# Patient Record
Sex: MALE | Race: BLACK OR AFRICAN AMERICAN | NOT HISPANIC OR LATINO
[De-identification: names, ages, dates, MRNs, and addresses within clinical notes are randomized per-mention and may not be internally consistent; named-entity substitution may affect disease eponyms.]

---

## 2020-12-15 ENCOUNTER — APPOINTMENT (OUTPATIENT)
Dept: UROLOGY | Facility: CLINIC | Age: 55
End: 2020-12-15
Payer: MEDICAID

## 2020-12-15 VITALS
BODY MASS INDEX: 27.83 KG/M2 | TEMPERATURE: 97.4 F | SYSTOLIC BLOOD PRESSURE: 123 MMHG | HEIGHT: 73 IN | DIASTOLIC BLOOD PRESSURE: 74 MMHG | HEART RATE: 59 BPM | WEIGHT: 210 LBS

## 2020-12-15 DIAGNOSIS — Z01.818 ENCOUNTER FOR OTHER PREPROCEDURAL EXAMINATION: ICD-10-CM

## 2020-12-15 PROBLEM — Z00.00 ENCOUNTER FOR PREVENTIVE HEALTH EXAMINATION: Status: ACTIVE | Noted: 2020-12-15

## 2020-12-15 PROCEDURE — 99072 ADDL SUPL MATRL&STAF TM PHE: CPT

## 2020-12-15 PROCEDURE — 99205 OFFICE O/P NEW HI 60 MIN: CPT | Mod: 25

## 2020-12-15 PROCEDURE — 76870 US EXAM SCROTUM: CPT

## 2020-12-15 PROCEDURE — 93976 VASCULAR STUDY: CPT

## 2020-12-15 NOTE — HISTORY OF PRESENT ILLNESS
[FreeTextEntry1] : Right side hydrocele \par - noticed in June\par was in [facility] to have it drained without success \par No records are available to me at this point.\par \par no medical issues \par no medications \par \par no DM \par never had CAD \par \par erections are fair \par symptomatic - difficulties walking and working \par

## 2020-12-15 NOTE — PHYSICAL EXAM
[General Appearance - Well Developed] : well developed [General Appearance - Well Nourished] : well nourished [Normal Appearance] : normal appearance [Well Groomed] : well groomed [General Appearance - In No Acute Distress] : no acute distress [Edema] : no peripheral edema [Respiration, Rhythm And Depth] : normal respiratory rhythm and effort [Exaggerated Use Of Accessory Muscles For Inspiration] : no accessory muscle use [Abdomen Soft] : soft [Abdomen Tenderness] : non-tender [Costovertebral Angle Tenderness] : no ~M costovertebral angle tenderness [Urethral Meatus] : meatus normal [Urinary Bladder Findings] : the bladder was normal on palpation [Scrotum] : the scrotum was normal [Testes Mass (___cm)] : there were no testicular masses [Normal Station and Gait] : the gait and station were normal for the patient's age [] : no rash [No Focal Deficits] : no focal deficits [Oriented To Time, Place, And Person] : oriented to person, place, and time [Affect] : the affect was normal [Mood] : the mood was normal [Not Anxious] : not anxious [No Palpable Adenopathy] : no palpable adenopathy [FreeTextEntry1] : Right hydrocele 40 cm x 35 cm left testis palpable , scar from hernia repair the right side

## 2020-12-15 NOTE — ASSESSMENT
[FreeTextEntry1] : Extremely large hydrocele \par complex\par This is a 55-year-old male who presented with extremely large right scrotal mass.  The mass seems to be there for over a year.  Since June it has been bothering him more.  He has problems walking and working because of the size of the mass.\par \par He denies any weight loss, abdominal pain, he has no constipation, he is no nausea or vomiting no problems with GI track indicating possible obstruction or involvement of the GI tract in this large mass.  He underwent drainage of the mass in the [facility] without success.  We have not seen any records from the previous hospital.\par \par On the physical examination which was performed with nurse practitioner and also Dr. Gr he is a very large mass almost to the knees on the right side.  His left testicle is palpable and atrophic.  The penis is covered by the skin of that large scrotal mass.  There is no fixation of the scrotal skin to the penis or pelvic floor.  The mass is nontender.  Patient has no pain.  His discomfort is from mechanical issue due to his the size of the mass.\par \par The scrotal ultrasound performed showed distinct ending of the fluid collection in the lower right abdomen under the skin.  The mass does not seem to going to the inguinal canal and does not seem to include intestine or peritoneal cavity.  Within the mass there are occasional separate and papillary-like structures.  This is consistent with complex cystic mass.  Within the left testicle there is a second cystic structure with papillary-like appearance within the testis.\par \par He denies any travel abroad to the areas where you feel [place] is could be considered.\par \par I had long discussion with this patient that this is large and complex issue.  We need to get better imaging.  I contacted radiologist here at [facility] regarding performing CT scan of the abdomen pelvis as well as chest CT to exclude any metastasis.\par \par I do not think this is malignant disease because he does not seem to clinically invade any of the structures.\par \par He is otherwise healthy and does not take any medications.\par \par I referred him to plastic surgeon Dr. Spaulding for consultation as he may need skin reduction and flap management to cover any dead space to prevent lymphocele.\par \par We will admit him for 23-hour observation or inpatient.  Type and screen will be performed.  The possibility of orchiectomy on the right side have also been discussed.\par \par I have also contacted  pathologist to be sure that we are able to perform frozen section and assess pathology to exclude malignancy during the surgery.  We will plan to surgery in January together with the plastics.\par \par Total time over 60 minutes, more than 50% of time spent counseling and coordinating care.

## 2020-12-17 PROBLEM — Z01.818 PREOPERATIVE EVALUATION TO RULE OUT SURGICAL CONTRAINDICATION: Status: ACTIVE | Noted: 2020-12-17

## 2020-12-17 LAB
ALBUMIN SERPL ELPH-MCNC: 4.2 G/DL
ALP BLD-CCNC: 74 U/L
ALT SERPL-CCNC: 8 U/L
ANION GAP SERPL CALC-SCNC: 11 MMOL/L
AST SERPL-CCNC: 13 U/L
BASOPHILS # BLD AUTO: 0.03 K/UL
BASOPHILS NFR BLD AUTO: 0.4 %
BILIRUB SERPL-MCNC: 0.3 MG/DL
BUN SERPL-MCNC: 16 MG/DL
CALCIUM SERPL-MCNC: 9.7 MG/DL
CHLORIDE SERPL-SCNC: 105 MMOL/L
CO2 SERPL-SCNC: 26 MMOL/L
CREAT SERPL-MCNC: 1.23 MG/DL
EOSINOPHIL # BLD AUTO: 0.03 K/UL
EOSINOPHIL NFR BLD AUTO: 0.4 %
GLUCOSE SERPL-MCNC: 100 MG/DL
HBV CORE IGG+IGM SER QL: NONREACTIVE
HCT VFR BLD CALC: 33.4 %
HCV AB SER QL: NONREACTIVE
HCV S/CO RATIO: 0.24 S/CO
HGB BLD-MCNC: 9.5 G/DL
IMM GRANULOCYTES NFR BLD AUTO: 0.7 %
LYMPHOCYTES # BLD AUTO: 2.52 K/UL
LYMPHOCYTES NFR BLD AUTO: 34.3 %
MAN DIFF?: NORMAL
MCHC RBC-ENTMCNC: 21.8 PG
MCHC RBC-ENTMCNC: 28.4 GM/DL
MCV RBC AUTO: 76.8 FL
MONOCYTES # BLD AUTO: 0.48 K/UL
MONOCYTES NFR BLD AUTO: 6.5 %
NEUTROPHILS # BLD AUTO: 4.23 K/UL
NEUTROPHILS NFR BLD AUTO: 57.7 %
PLATELET # BLD AUTO: 295 K/UL
POTASSIUM SERPL-SCNC: 4.9 MMOL/L
PROT SERPL-MCNC: 9.3 G/DL
PSA FREE FLD-MCNC: 42 %
PSA FREE SERPL-MCNC: 0.11 NG/ML
PSA SERPL-MCNC: 0.25 NG/ML
RBC # BLD: 4.35 M/UL
RBC # FLD: 15.6 %
SODIUM SERPL-SCNC: 142 MMOL/L
WBC # FLD AUTO: 7.34 K/UL

## 2020-12-17 RX ORDER — LATANOPROST/PF 0.005 %
0.01 DROPS OPHTHALMIC (EYE)
Qty: 2 | Refills: 0 | Status: ACTIVE | COMMUNITY
Start: 2020-11-17

## 2020-12-17 RX ORDER — BRIMONIDINE TARTRATE, TIMOLOL MALEATE 2; 5 MG/ML; MG/ML
0.2-0.5 SOLUTION/ DROPS OPHTHALMIC
Qty: 5 | Refills: 0 | Status: ACTIVE | COMMUNITY
Start: 2020-11-17

## 2020-12-22 LAB
TESTOST BND SERPL-MCNC: 5.2 PG/ML
TESTOST SERPL-MCNC: 414.7 NG/DL

## 2020-12-29 ENCOUNTER — APPOINTMENT (OUTPATIENT)
Dept: CT IMAGING | Facility: CLINIC | Age: 55
End: 2020-12-29
Payer: MEDICAID

## 2020-12-29 ENCOUNTER — OUTPATIENT (OUTPATIENT)
Dept: OUTPATIENT SERVICES | Facility: HOSPITAL | Age: 55
LOS: 1 days | End: 2020-12-29
Payer: MEDICAID

## 2020-12-29 DIAGNOSIS — R19.00 INTRA-ABDOMINAL AND PELVIC SWELLING, MASS AND LUMP, UNSPECIFIED SITE: ICD-10-CM

## 2020-12-29 PROCEDURE — 71260 CT THORAX DX C+: CPT

## 2020-12-29 PROCEDURE — 74177 CT ABD & PELVIS W/CONTRAST: CPT

## 2020-12-29 PROCEDURE — 74177 CT ABD & PELVIS W/CONTRAST: CPT | Mod: 26

## 2020-12-29 PROCEDURE — 71260 CT THORAX DX C+: CPT | Mod: 26

## 2021-01-04 ENCOUNTER — NON-APPOINTMENT (OUTPATIENT)
Age: 56
End: 2021-01-04

## 2021-01-04 DIAGNOSIS — D64.9 ANEMIA, UNSPECIFIED: ICD-10-CM

## 2021-01-12 ENCOUNTER — APPOINTMENT (OUTPATIENT)
Dept: COLORECTAL SURGERY | Facility: CLINIC | Age: 56
End: 2021-01-12
Payer: MEDICAID

## 2021-01-12 VITALS
HEART RATE: 66 BPM | DIASTOLIC BLOOD PRESSURE: 70 MMHG | SYSTOLIC BLOOD PRESSURE: 106 MMHG | TEMPERATURE: 98 F | RESPIRATION RATE: 16 BRPM | HEIGHT: 73 IN | WEIGHT: 215 LBS | BODY MASS INDEX: 28.49 KG/M2 | OXYGEN SATURATION: 99 %

## 2021-01-12 DIAGNOSIS — Z80.9 FAMILY HISTORY OF MALIGNANT NEOPLASM, UNSPECIFIED: ICD-10-CM

## 2021-01-12 DIAGNOSIS — H40.9 UNSPECIFIED GLAUCOMA: ICD-10-CM

## 2021-01-12 DIAGNOSIS — N50.0 ATROPHY OF TESTIS: ICD-10-CM

## 2021-01-12 PROCEDURE — 99072 ADDL SUPL MATRL&STAF TM PHE: CPT

## 2021-01-12 PROCEDURE — 99204 OFFICE O/P NEW MOD 45 MIN: CPT

## 2021-01-12 NOTE — HISTORY OF PRESENT ILLNESS
[FreeTextEntry1] : 55-year-old male developed an inguinal hernia over a year ago.He underwent repair left inguinal hernia at an outside facility in which she reports drainage catheters were placed on the right side. He was told he had a hydrocele, but the drain would not completely resolve his issues. Since that time he reports increased size of the right scrotum. He was evaluated by urology and a CT scan was performed showing a large scrotal cystic mass. There is a small amount of air and a portion of this which is likely secondary to prior instrumentation. He reports pain at the site no fevers or chills no change in bowel habits no nausea or vomiting. No dominant pain. Prior history of laparoscopic cholecystectomy. Patient does not have records from outside facility for review

## 2021-01-12 NOTE — ASSESSMENT
[FreeTextEntry1] : Large right scrotal mass of unclear etiology. Likely consistent with a large hydrocele however cyst/malignancy cannot be ruled out\par -Case has been discussed with urology\par -Will obtain MRI to better evaluate lesion, however additional imaging may not fully elucidate diagnoses\par -Patient will be scheduled for right inguinal/scrotal mass excision. Procedure will be performed the right inguinal incision with possible diagnostic laparoscopy possible laparotomy. Dr. Ramos is also discussed the possibility of orchiectomy at the time of the procedure. Patient will require inguinal hernia repair and possibly mesh implantation\par -Risks and benefits were reviewed\par -Patient will undergo repeat imaging\par -Will initiate scheduling of resection.\par -Plastic surgery evaluation

## 2021-01-12 NOTE — CONSULT LETTER
[Dear  ___] : Dear  [unfilled], [Consult Letter:] : I had the pleasure of evaluating your patient, [unfilled]. [Please see my note below.] : Please see my note below. [Consult Closing:] : Thank you very much for allowing me to participate in the care of this patient.  If you have any questions, please do not hesitate to contact me. [Sincerely,] : Sincerely, [FreeTextEntry2] : Ricky Ramos [FreeTextEntry3] : Ryan Martini MD FACS\par Chief Colon and Rectal Surgery\par [facility]

## 2021-01-12 NOTE — PHYSICAL EXAM
[Normal Breath Sounds] : Normal breath sounds [Normal Heart Sounds] : normal heart sounds [Normal Rate and Rhythm] : normal rate and rhythm [No Rash or Lesion] : No rash or lesion [Alert] : alert [Oriented to Person] : oriented to person [Oriented to Place] : oriented to place [Oriented to Time] : oriented to time [Calm] : calm [de-identified] : round, NT/ND, +BS [de-identified] : Normal Male [de-identified] : NC/AT [de-identified] : MARIANELA/+ROM [de-identified] : Intact

## 2021-01-13 ENCOUNTER — APPOINTMENT (OUTPATIENT)
Dept: PLASTIC SURGERY | Facility: CLINIC | Age: 56
End: 2021-01-13
Payer: MEDICAID

## 2021-01-13 VITALS
HEIGHT: 73 IN | HEART RATE: 52 BPM | BODY MASS INDEX: 28.89 KG/M2 | DIASTOLIC BLOOD PRESSURE: 66 MMHG | TEMPERATURE: 98.2 F | SYSTOLIC BLOOD PRESSURE: 110 MMHG | WEIGHT: 218 LBS

## 2021-01-13 DIAGNOSIS — Z60.2 PROBLEMS RELATED TO LIVING ALONE: ICD-10-CM

## 2021-01-13 DIAGNOSIS — Z72.3 LACK OF PHYSICAL EXERCISE: ICD-10-CM

## 2021-01-13 DIAGNOSIS — R19.00 INTRA-ABDOMINAL AND PELVIC SWELLING, MASS AND LUMP, UNSPECIFIED SITE: ICD-10-CM

## 2021-01-13 PROCEDURE — 99203 OFFICE O/P NEW LOW 30 MIN: CPT

## 2021-01-13 PROCEDURE — 99072 ADDL SUPL MATRL&STAF TM PHE: CPT

## 2021-01-13 SDOH — SOCIAL STABILITY - SOCIAL INSECURITY: PROBLEMS RELATED TO LIVING ALONE: Z60.2

## 2021-01-13 NOTE — REVIEW OF SYSTEMS
[Eyesight Problems] : eyesight problems [Dry Eyes] : dryness of the eyes [Proptosis] : proptosis [Negative] : Heme/Lymph

## 2021-01-16 PROBLEM — Z72.3 DOES NOT EXERCISE: Status: ACTIVE | Noted: 2021-01-13

## 2021-01-16 PROBLEM — R19.00 PELVIC MASS IN MALE: Status: ACTIVE | Noted: 2020-12-15

## 2021-01-16 PROBLEM — Z60.2 LIVES ALONE: Status: ACTIVE | Noted: 2021-01-13

## 2021-01-16 NOTE — REASON FOR VISIT
[Consultation] : a consultation visit [FreeTextEntry1] : Patient present to the office today at the request of Dr. Ricky Ramos for hydrocele removal from a previous hernia operation

## 2021-01-16 NOTE — PHYSICAL EXAM
[de-identified] : NAD. BMI 28.8 [de-identified] : right hemiscrotum with large mass c/w hydrocele, tense, with inability to palpate inguinal ring. Well-healed left inguinal incision. No visible lesions over penis, scrotum, or perineum. left testis without mass or significant palpable abnormality [de-identified] : Head: NC/AT\par Eyes: sclerae clear, EOMI\par ENT: hearing grossly normal, no gross nasal deformity\par Resp: normal respiratory effort, no accessory muscle use\par MSK: normal gait and posture\par Psych: normal affect, appropriate\par

## 2021-01-16 NOTE — HISTORY OF PRESENT ILLNESS
[FreeTextEntry1] : 54 y/o man with large right hydrocele presents for discussion of reconstructive options following planned hydrocele removal and hernia repair. The patient has already had a left inguinal hernia repair at an outside hospital which required drainage of the right scrotal mass. The patient has been seen by colorectal surgery and there is a plan for MRI to better characterize the lesion. The patient is here to discuss reconstructive options.\par \par He denies any wounds on the scrotum. He denies any F/C/S. He denies any sensory abnormalities of the scrotum or penis.\par \par He lives alone. He does not work. He denies nicotine, drinking, and recreational drug use.

## 2021-01-16 NOTE — ASSESSMENT
[FreeTextEntry1] : Large right scrotal mass. Will plan to assist with reconstruction. It is possible that, if the scrotal skin is healthy-appearing and not too significantly expanded, that no scrotal skin excision may be required. However, if limited recoil or skin not healthy-appearing following mass excision, or scrotal access required, will plan for skin excision and complex repair.

## 2021-01-26 ENCOUNTER — OUTPATIENT (OUTPATIENT)
Dept: OUTPATIENT SERVICES | Facility: HOSPITAL | Age: 56
LOS: 1 days | End: 2021-01-26
Payer: MEDICAID

## 2021-01-26 ENCOUNTER — RESULT REVIEW (OUTPATIENT)
Age: 56
End: 2021-01-26

## 2021-01-26 ENCOUNTER — APPOINTMENT (OUTPATIENT)
Dept: MRI IMAGING | Facility: CLINIC | Age: 56
End: 2021-01-26
Payer: MEDICAID

## 2021-01-26 DIAGNOSIS — R19.00 INTRA-ABDOMINAL AND PELVIC SWELLING, MASS AND LUMP, UNSPECIFIED SITE: ICD-10-CM

## 2021-01-26 DIAGNOSIS — Z00.8 ENCOUNTER FOR OTHER GENERAL EXAMINATION: ICD-10-CM

## 2021-01-26 PROCEDURE — 72197 MRI PELVIS W/O & W/DYE: CPT

## 2021-01-26 PROCEDURE — 72197 MRI PELVIS W/O & W/DYE: CPT | Mod: 26

## 2021-02-05 ENCOUNTER — OUTPATIENT (OUTPATIENT)
Dept: OUTPATIENT SERVICES | Facility: HOSPITAL | Age: 56
LOS: 1 days | End: 2021-02-05
Payer: COMMERCIAL

## 2021-02-05 VITALS
DIASTOLIC BLOOD PRESSURE: 84 MMHG | HEART RATE: 61 BPM | SYSTOLIC BLOOD PRESSURE: 134 MMHG | OXYGEN SATURATION: 98 % | WEIGHT: 220.02 LBS | HEIGHT: 73 IN | RESPIRATION RATE: 16 BRPM | TEMPERATURE: 98 F

## 2021-02-05 DIAGNOSIS — Z98.890 OTHER SPECIFIED POSTPROCEDURAL STATES: Chronic | ICD-10-CM

## 2021-02-05 DIAGNOSIS — R19.00 INTRA-ABDOMINAL AND PELVIC SWELLING, MASS AND LUMP, UNSPECIFIED SITE: ICD-10-CM

## 2021-02-05 DIAGNOSIS — H40.9 UNSPECIFIED GLAUCOMA: ICD-10-CM

## 2021-02-05 DIAGNOSIS — K80.20 CALCULUS OF GALLBLADDER WITHOUT CHOLECYSTITIS WITHOUT OBSTRUCTION: Chronic | ICD-10-CM

## 2021-02-05 DIAGNOSIS — Z01.812 ENCOUNTER FOR PREPROCEDURAL LABORATORY EXAMINATION: ICD-10-CM

## 2021-02-05 LAB
BLD GP AB SCN SERPL QL: POSITIVE — SIGNIFICANT CHANGE UP
HCT VFR BLD CALC: 35 % — LOW (ref 39–50)
HGB BLD-MCNC: 9.5 G/DL — LOW (ref 13–17)
MCHC RBC-ENTMCNC: 21.6 PG — LOW (ref 27–34)
MCHC RBC-ENTMCNC: 27.1 GM/DL — LOW (ref 32–36)
MCV RBC AUTO: 79.5 FL — LOW (ref 80–100)
NRBC # BLD: 0 /100 WBCS — SIGNIFICANT CHANGE UP
NRBC # FLD: 0 K/UL — SIGNIFICANT CHANGE UP
PLATELET # BLD AUTO: 227 K/UL — SIGNIFICANT CHANGE UP (ref 150–400)
RBC # BLD: 4.4 M/UL — SIGNIFICANT CHANGE UP (ref 4.2–5.8)
RBC # FLD: 15.5 % — HIGH (ref 10.3–14.5)
RH IG SCN BLD-IMP: NEGATIVE — SIGNIFICANT CHANGE UP
WBC # BLD: 6.52 K/UL — SIGNIFICANT CHANGE UP (ref 3.8–10.5)
WBC # FLD AUTO: 6.52 K/UL — SIGNIFICANT CHANGE UP (ref 3.8–10.5)

## 2021-02-05 PROCEDURE — 86077 PHYS BLOOD BANK SERV XMATCH: CPT

## 2021-02-05 NOTE — H&P PST ADULT - NSICDXPROBLEM_GEN_ALL_CORE_FT
PROBLEM DIAGNOSES  Problem: Intra-abdominal and pelvic swelling, mass and lump, unspecified site  Assessment and Plan: Patient scheduled for excision of scrotal mass inguinal hernia repair, possible laparotomy, possible laparoscopic, possible orchiectomy on 2/12/2021  Written & verbal preop instructions, gi prophylaxis & surgical soap given  Pt verbalized good understanding.  Teach back done on surgical soap instructions.   PITER precaution, OR booking notified    Problem: Glaucoma  Assessment and Plan: Patient instructed to take Combigan and Latanoprost as per routine schedule    Problem: Encounter for preprocedure screening laboratory testing for COVID-19  Assessment and Plan: Patient aware of need for COVID testing prior to procedure and advised to co ordinate with surgeon.

## 2021-02-05 NOTE — H&P PST ADULT - NEGATIVE NEUROLOGICAL SYMPTOMS
no weakness/no paresthesias/no generalized seizures/no focal seizures/no syncope/no tremors/no vertigo/no loss of sensation/no headache/no loss of consciousness

## 2021-02-05 NOTE — H&P PST ADULT - ATTENDING COMMENTS
I have seen patient in pre-op and discussed urological part of procedure: resection of right complex scrotal-abdominal mass, possible right hydrocelectomy, possible right orchiectomy, possible cystoscopy with bilateral stent placement, scrotoplasty, possible scrotal resection.   Risks, benefits, and alternatives were discussed with patient. He wants to proceed with the procedure. Consent for urological part was signed. Please refer to Dr. Martini's, primary surgeon, note and consent for additional procedures.

## 2021-02-05 NOTE — H&P PST ADULT - NSICDXPASTMEDICALHX_GEN_ALL_CORE_FT
PAST MEDICAL HISTORY:  Glaucoma      PAST MEDICAL HISTORY:  Glaucoma     Intra-abdominal and pelvic swelling, mass and lump, unspecified site      PAST MEDICAL HISTORY:  Glaucoma     Hydrocele     Intra-abdominal and pelvic swelling, mass and lump, unspecified site

## 2021-02-05 NOTE — H&P PST ADULT - MUSCULOSKELETAL
details… ROM intact/no joint swelling/no joint erythema/no joint warmth/no calf tenderness/normal strength detailed exam

## 2021-02-05 NOTE — H&P PST ADULT - NEGATIVE OPHTHALMOLOGIC SYMPTOMS
no diplopia/no blurred vision L/no blurred vision R/no pain L/no pain R/no irritation L/no irritation R/no loss of vision R

## 2021-02-08 ENCOUNTER — OUTPATIENT (OUTPATIENT)
Dept: OUTPATIENT SERVICES | Facility: HOSPITAL | Age: 56
LOS: 1 days | End: 2021-02-08
Payer: COMMERCIAL

## 2021-02-08 DIAGNOSIS — Z98.890 OTHER SPECIFIED POSTPROCEDURAL STATES: Chronic | ICD-10-CM

## 2021-02-08 DIAGNOSIS — R19.00 INTRA-ABDOMINAL AND PELVIC SWELLING, MASS AND LUMP, UNSPECIFIED SITE: ICD-10-CM

## 2021-02-08 DIAGNOSIS — K80.20 CALCULUS OF GALLBLADDER WITHOUT CHOLECYSTITIS WITHOUT OBSTRUCTION: Chronic | ICD-10-CM

## 2021-02-08 PROBLEM — N43.3 HYDROCELE, UNSPECIFIED: Chronic | Status: ACTIVE | Noted: 2021-02-05

## 2021-02-08 PROBLEM — H40.9 UNSPECIFIED GLAUCOMA: Chronic | Status: ACTIVE | Noted: 2021-02-05

## 2021-02-11 ENCOUNTER — TRANSCRIPTION ENCOUNTER (OUTPATIENT)
Age: 56
End: 2021-02-11

## 2021-02-11 LAB — SARS-COV-2 RNA SPEC QL NAA+PROBE: SIGNIFICANT CHANGE UP

## 2021-02-11 PROCEDURE — 86079 PHYS BLOOD BANK SERV AUTHRJ: CPT

## 2021-02-12 ENCOUNTER — RESULT REVIEW (OUTPATIENT)
Age: 56
End: 2021-02-12

## 2021-02-12 ENCOUNTER — INPATIENT (INPATIENT)
Facility: HOSPITAL | Age: 56
LOS: 1 days | Discharge: HOME | End: 2021-02-14
Attending: STUDENT IN AN ORGANIZED HEALTH CARE EDUCATION/TRAINING PROGRAM | Admitting: STUDENT IN AN ORGANIZED HEALTH CARE EDUCATION/TRAINING PROGRAM
Payer: MEDICAID

## 2021-02-12 ENCOUNTER — APPOINTMENT (OUTPATIENT)
Dept: COLORECTAL SURGERY | Facility: HOSPITAL | Age: 56
End: 2021-02-12
Payer: MEDICAID

## 2021-02-12 VITALS
RESPIRATION RATE: 16 BRPM | HEIGHT: 73 IN | WEIGHT: 220.02 LBS | DIASTOLIC BLOOD PRESSURE: 93 MMHG | SYSTOLIC BLOOD PRESSURE: 151 MMHG | TEMPERATURE: 97 F | HEART RATE: 50 BPM | OXYGEN SATURATION: 98 %

## 2021-02-12 DIAGNOSIS — Z98.890 OTHER SPECIFIED POSTPROCEDURAL STATES: Chronic | ICD-10-CM

## 2021-02-12 DIAGNOSIS — R19.00 INTRA-ABDOMINAL AND PELVIC SWELLING, MASS AND LUMP, UNSPECIFIED SITE: ICD-10-CM

## 2021-02-12 DIAGNOSIS — K80.20 CALCULUS OF GALLBLADDER WITHOUT CHOLECYSTITIS WITHOUT OBSTRUCTION: Chronic | ICD-10-CM

## 2021-02-12 LAB
ANION GAP SERPL CALC-SCNC: 11 MMOL/L — SIGNIFICANT CHANGE UP (ref 7–14)
BUN SERPL-MCNC: 22 MG/DL — SIGNIFICANT CHANGE UP (ref 7–23)
CALCIUM SERPL-MCNC: 8.8 MG/DL — SIGNIFICANT CHANGE UP (ref 8.4–10.5)
CHLORIDE SERPL-SCNC: 105 MMOL/L — SIGNIFICANT CHANGE UP (ref 98–107)
CO2 SERPL-SCNC: 25 MMOL/L — SIGNIFICANT CHANGE UP (ref 22–31)
CREAT SERPL-MCNC: 1.03 MG/DL — SIGNIFICANT CHANGE UP (ref 0.5–1.3)
GAS PNL BLDA: SIGNIFICANT CHANGE UP
GLUCOSE SERPL-MCNC: 206 MG/DL — HIGH (ref 70–99)
HCT VFR BLD CALC: 29.5 % — LOW (ref 39–50)
HGB BLD-MCNC: 8.3 G/DL — LOW (ref 13–17)
LACTATE SERPL-SCNC: 1.2 MMOL/L — SIGNIFICANT CHANGE UP (ref 0.5–2)
MAGNESIUM SERPL-MCNC: 1.8 MG/DL — SIGNIFICANT CHANGE UP (ref 1.6–2.6)
MCHC RBC-ENTMCNC: 21.8 PG — LOW (ref 27–34)
MCHC RBC-ENTMCNC: 28.1 GM/DL — LOW (ref 32–36)
MCV RBC AUTO: 77.4 FL — LOW (ref 80–100)
NRBC # BLD: 0 /100 WBCS — SIGNIFICANT CHANGE UP
NRBC # FLD: 0 K/UL — SIGNIFICANT CHANGE UP
PHOSPHATE SERPL-MCNC: 4 MG/DL — SIGNIFICANT CHANGE UP (ref 2.5–4.5)
PLATELET # BLD AUTO: 181 K/UL — SIGNIFICANT CHANGE UP (ref 150–400)
POTASSIUM SERPL-MCNC: 3.8 MMOL/L — SIGNIFICANT CHANGE UP (ref 3.5–5.3)
POTASSIUM SERPL-SCNC: 3.8 MMOL/L — SIGNIFICANT CHANGE UP (ref 3.5–5.3)
RBC # BLD: 3.81 M/UL — LOW (ref 4.2–5.8)
RBC # FLD: 15.2 % — HIGH (ref 10.3–14.5)
SODIUM SERPL-SCNC: 141 MMOL/L — SIGNIFICANT CHANGE UP (ref 135–145)
WBC # BLD: 9.25 K/UL — SIGNIFICANT CHANGE UP (ref 3.8–10.5)
WBC # FLD AUTO: 9.25 K/UL — SIGNIFICANT CHANGE UP (ref 3.8–10.5)

## 2021-02-12 PROCEDURE — 55180 REVISION OF SCROTUM: CPT | Mod: 59,22,RT

## 2021-02-12 PROCEDURE — 27045 EXC HIP/PELV TUM DEEP 5 CM/>: CPT

## 2021-02-12 PROCEDURE — 88305 TISSUE EXAM BY PATHOLOGIST: CPT | Mod: 26

## 2021-02-12 PROCEDURE — 49205: CPT | Mod: RT,22,62

## 2021-02-12 PROCEDURE — 49520 REREPAIR ING HERNIA REDUCE: CPT

## 2021-02-12 PROCEDURE — 88302 TISSUE EXAM BY PATHOLOGIST: CPT | Mod: 26

## 2021-02-12 PROCEDURE — 88331 PATH CONSLTJ SURG 1 BLK 1SPC: CPT | Mod: 26

## 2021-02-12 PROCEDURE — 22901 EXC ABDL TUM DEEP 5 CM/>: CPT

## 2021-02-12 PROCEDURE — 93010 ELECTROCARDIOGRAM REPORT: CPT

## 2021-02-12 PROCEDURE — 54535 EXTENSIVE TESTIS SURGERY: CPT | Mod: 59,22,RT

## 2021-02-12 PROCEDURE — 44955 APPENDECTOMY ADD-ON: CPT

## 2021-02-12 RX ORDER — SODIUM CHLORIDE 9 MG/ML
1000 INJECTION, SOLUTION INTRAVENOUS
Refills: 0 | Status: DISCONTINUED | OUTPATIENT
Start: 2021-02-12 | End: 2021-02-13

## 2021-02-12 RX ORDER — INFLUENZA VIRUS VACCINE 15; 15; 15; 15 UG/.5ML; UG/.5ML; UG/.5ML; UG/.5ML
0.5 SUSPENSION INTRAMUSCULAR ONCE
Refills: 0 | Status: DISCONTINUED | OUTPATIENT
Start: 2021-02-12 | End: 2021-02-14

## 2021-02-12 RX ORDER — BRIMONIDINE TARTRATE, TIMOLOL MALEATE 2; 5 MG/ML; MG/ML
1 SOLUTION/ DROPS OPHTHALMIC
Qty: 0 | Refills: 0 | DISCHARGE

## 2021-02-12 RX ORDER — LATANOPROST 0.05 MG/ML
1 SOLUTION/ DROPS OPHTHALMIC; TOPICAL
Qty: 0 | Refills: 0 | DISCHARGE

## 2021-02-12 RX ORDER — ACETAMINOPHEN 500 MG
1000 TABLET ORAL EVERY 6 HOURS
Refills: 0 | Status: COMPLETED | OUTPATIENT
Start: 2021-02-12 | End: 2021-02-13

## 2021-02-12 RX ORDER — HYDROMORPHONE HYDROCHLORIDE 2 MG/ML
0.5 INJECTION INTRAMUSCULAR; INTRAVENOUS; SUBCUTANEOUS
Refills: 0 | Status: DISCONTINUED | OUTPATIENT
Start: 2021-02-12 | End: 2021-02-13

## 2021-02-12 RX ORDER — POLYETHYLENE GLYCOL 3350 17 G/17G
17 POWDER, FOR SOLUTION ORAL DAILY
Refills: 0 | Status: DISCONTINUED | OUTPATIENT
Start: 2021-02-12 | End: 2021-02-14

## 2021-02-12 RX ORDER — OXYCODONE HYDROCHLORIDE 5 MG/1
5 TABLET ORAL EVERY 4 HOURS
Refills: 0 | Status: DISCONTINUED | OUTPATIENT
Start: 2021-02-12 | End: 2021-02-14

## 2021-02-12 RX ORDER — SODIUM CHLORIDE 9 MG/ML
1000 INJECTION, SOLUTION INTRAVENOUS
Refills: 0 | Status: DISCONTINUED | OUTPATIENT
Start: 2021-02-12 | End: 2021-02-12

## 2021-02-12 RX ORDER — OXYCODONE HYDROCHLORIDE 5 MG/1
10 TABLET ORAL EVERY 4 HOURS
Refills: 0 | Status: DISCONTINUED | OUTPATIENT
Start: 2021-02-12 | End: 2021-02-14

## 2021-02-12 RX ORDER — ENOXAPARIN SODIUM 100 MG/ML
40 INJECTION SUBCUTANEOUS EVERY 24 HOURS
Refills: 0 | Status: DISCONTINUED | OUTPATIENT
Start: 2021-02-12 | End: 2021-02-14

## 2021-02-12 RX ORDER — SENNA PLUS 8.6 MG/1
2 TABLET ORAL AT BEDTIME
Refills: 0 | Status: DISCONTINUED | OUTPATIENT
Start: 2021-02-12 | End: 2021-02-14

## 2021-02-12 RX ADMIN — SODIUM CHLORIDE 50 MILLILITER(S): 9 INJECTION, SOLUTION INTRAVENOUS at 13:34

## 2021-02-12 RX ADMIN — HYDROMORPHONE HYDROCHLORIDE 0.5 MILLIGRAM(S): 2 INJECTION INTRAMUSCULAR; INTRAVENOUS; SUBCUTANEOUS at 14:04

## 2021-02-12 RX ADMIN — Medication 400 MILLIGRAM(S): at 18:38

## 2021-02-12 RX ADMIN — ENOXAPARIN SODIUM 40 MILLIGRAM(S): 100 INJECTION SUBCUTANEOUS at 14:06

## 2021-02-12 NOTE — BRIEF OPERATIVE NOTE - OPERATION/FINDINGS
- large extraperitoneal mass tracking into abdomen  - peritoneum was violated during the dissection  - appendix was indurated with fecaliths so was stapled with Endo MATEUSZ 60  - right inguinal hernia defect repaired primarily by approximating fascia with the shelving edge   - scrotoplasty by Urology  - Phil drain on top of hernia repair

## 2021-02-12 NOTE — BRIEF OPERATIVE NOTE - NSICDXBRIEFPROCEDURE_GEN_ALL_CORE_FT
PROCEDURES:  Exploration, scrotum, with scrotal mass excision 12-Feb-2021 13:01:00  Fanny Ruby  Appendectomy, open 12-Feb-2021 12:59:53  Fanny Ruby  Hernioplasty of inguinal hernia in patient older than 5 years of age 12-Feb-2021 12:59:40 right Fanyn Ruby

## 2021-02-12 NOTE — CHART NOTE - NSCHARTNOTEFT_GEN_A_CORE
Post op Check: 54 yo M POD #0 scrotal exploration, excision of scrotal mass    Pt seen and examined without complaints. Pain is controlled. Denies SOB/CP/N/V.     Vital Signs Last 24 Hrs  T(C): 36.8 (12 Feb 2021 16:00), Max: 36.8 (12 Feb 2021 16:00)  T(F): 98.2 (12 Feb 2021 16:00), Max: 98.2 (12 Feb 2021 16:00)  HR: 56 (12 Feb 2021 15:30) (50 - 65)  BP: 146/86 (12 Feb 2021 16:30) (122/66 - 151/93)  BP(mean): 91 (12 Feb 2021 15:30) (79 - 92)  RR: 14 (12 Feb 2021 16:30) (14 - 17)  SpO2: 99% (12 Feb 2021 16:30) (93% - 99%)    I&O's Summary  Alisia: 555 yellow  RICO: 30 SS  11 Feb 2021 07:01  -  12 Feb 2021 07:00  --------------------------------------------------------  IN: 30 mL / OUT: 0 mL / NET: 30 mL    12 Feb 2021 07:01  -  12 Feb 2021 17:28  --------------------------------------------------------  IN: 250 mL / OUT: 585 mL / NET: -335 mL        Physical Exam  Gen: NAD  Pulm: No respiratory distress, clear to auscultation  CV: Reg  Abd: Obese, Soft, NT, dressing RLQ c/d/i  : scrotal fluff in place, c/d/i, alisia secured  Venodynes: in place                          8.3    9.25  )-----------( 181      ( 12 Feb 2021 13:34 )             29.5       02-12    141  |  105  |  22  ----------------------------<  206<H>  3.8   |  25  |  1.03    Ca    8.8      12 Feb 2021 13:34  Phos  4.0     02-12  Mg     1.8     02-12          Plan:   Keep crump in place for now  Rest of care per gen surg

## 2021-02-12 NOTE — BRIEF OPERATIVE NOTE - NSICDXBRIEFPREOP_GEN_ALL_CORE_FT
PRE-OP DIAGNOSIS:  Inguinal hernia 12-Feb-2021 13:01:32 right Fanny Ruby  Scrotal mass 12-Feb-2021 13:01:24  Fanny Ruby

## 2021-02-12 NOTE — CHART NOTE - NSCHARTNOTEFT_GEN_A_CORE
CAPRINI SCORE    AGE RELATED RISK FACTORS                                                             [x] Age 41-60 years                                            (1 Point)  [ ] Age: 61-74 years                                           (2 Points)                 [ ] Age= 75 years                                                (3 Points)             DISEASE RELATED RISK FACTORS                                                       [ ] Edema in the lower extremities                 (1 Point)                     [ ] Varicose veins                                               (1 Point)                                 [x] BMI > 25 Kg/m2                                            (1 Point)                                  [ ] Serious infection (ie PNA)                            (1 Point)                     [ ] Lung disease ( COPD, Emphysema)            (1 Point)                                                                          [ ] Acute myocardial infarction                         (1 Point)                  [ ] Congestive heart failure (in the previous month)  (1 Point)         [ ] Inflammatory bowel disease                            (1 Point)                  [ ] Central venous access, PICC or Port               (2 points)       (within the last month)                                                                [ ] Stroke (in the previous month)                        (5 Points)    [ ] Previous or present malignancy                       (2 points)                                                                                                                                                         HEMATOLOGY RELATED FACTORS                                                         [ ] Prior episodes of VTE                                     (3 Points)                     [ ] Positive family history for VTE                      (3 Points)                  [ ] Prothrombin 13431 A                                     (3 Points)                     [ ] Factor V Leiden                                                (3 Points)                        [ ] Lupus anticoagulants                                      (3 Points)                                                           [ ] Anticardiolipin antibodies                              (3 Points)                                                       [ ] High homocysteine in the blood                   (3 Points)                                             [ ] Other congenital or acquired thrombophilia      (3 Points)                                                [ ] Heparin induced thrombocytopenia                  (3 Points)                                        MOBILITY RELATED FACTORS  [ ] Bed rest                                                         (1 Point)  [ ] Plaster cast                                                    (2 points)  [ ] Bed bound for more than 72 hours           (2 Points)    GENDER SPECIFIC FACTORS  [ ] Pregnancy or had a baby within the last month   (1 Point)  [ ] Post-partum < 6 weeks                                   (1 Point)  [ ] Hormonal therapy  or oral contraception   (1 Point)  [ ] History of pregnancy complications              (1 point)  [ ] Unexplained or recurrent              (1 Point)    OTHER RISK FACTORS                                           (1 Point)  BMI >40, smoking, diabetes requiring insulin, chemotherapy  blood transfusions and length of surgery over 2 hours    SURGERY RELATED RISK FACTORS  [ ]  Section within the last month     (1 Point)  [ ] Minor surgery                                                  (1 Point)  [ ] Arthroscopic surgery                                       (2 Points)  [x] Planned major surgery lasting more            (2 Points)      than 45 minutes     [ ] Elective hip or knee joint replacement       (5 points)       surgery                                                TRAUMA RELATED RISK FACTORS  [ ] Fracture of the hip, pelvis, or leg                       (5 Points)  [ ] Spinal cord injury resulting in paralysis             (5 points)       (in the previous month)    [ ] Paralysis  (less than 1 month)                             (5 Points)  [ ] Multiple Trauma within 1 month                        (5 Points)    Total Score [  4  ]    Caprini Score 0-2: Low Risk, NO VTE prophylaxis required for most patients, encourage ambulation  Caprini Score 3-6: Moderate Risk , pharmacologic VTE prophylaxis is indicated for most patients (in the absence of contraindications)  Caprini Score Greater than or =7: High risk, pharmacologic VTE prophylaxis indicated for most patients (in the absence of contraindications)

## 2021-02-13 LAB
ANION GAP SERPL CALC-SCNC: 11 MMOL/L — SIGNIFICANT CHANGE UP (ref 7–14)
BUN SERPL-MCNC: 17 MG/DL — SIGNIFICANT CHANGE UP (ref 7–23)
CALCIUM SERPL-MCNC: 8.6 MG/DL — SIGNIFICANT CHANGE UP (ref 8.4–10.5)
CHLORIDE SERPL-SCNC: 103 MMOL/L — SIGNIFICANT CHANGE UP (ref 98–107)
CO2 SERPL-SCNC: 26 MMOL/L — SIGNIFICANT CHANGE UP (ref 22–31)
CREAT SERPL-MCNC: 1.06 MG/DL — SIGNIFICANT CHANGE UP (ref 0.5–1.3)
GLUCOSE SERPL-MCNC: 184 MG/DL — HIGH (ref 70–99)
GRAM STN FLD: SIGNIFICANT CHANGE UP
HCT VFR BLD CALC: 29.2 % — LOW (ref 39–50)
HGB BLD-MCNC: 8.2 G/DL — LOW (ref 13–17)
MAGNESIUM SERPL-MCNC: 1.9 MG/DL — SIGNIFICANT CHANGE UP (ref 1.6–2.6)
MCHC RBC-ENTMCNC: 21.9 PG — LOW (ref 27–34)
MCHC RBC-ENTMCNC: 28.1 GM/DL — LOW (ref 32–36)
MCV RBC AUTO: 77.9 FL — LOW (ref 80–100)
NRBC # BLD: 0 /100 WBCS — SIGNIFICANT CHANGE UP
NRBC # FLD: 0 K/UL — SIGNIFICANT CHANGE UP
PHOSPHATE SERPL-MCNC: 3.2 MG/DL — SIGNIFICANT CHANGE UP (ref 2.5–4.5)
PLATELET # BLD AUTO: 191 K/UL — SIGNIFICANT CHANGE UP (ref 150–400)
POTASSIUM SERPL-MCNC: 4 MMOL/L — SIGNIFICANT CHANGE UP (ref 3.5–5.3)
POTASSIUM SERPL-SCNC: 4 MMOL/L — SIGNIFICANT CHANGE UP (ref 3.5–5.3)
RBC # BLD: 3.75 M/UL — LOW (ref 4.2–5.8)
RBC # FLD: 15.3 % — HIGH (ref 10.3–14.5)
SODIUM SERPL-SCNC: 140 MMOL/L — SIGNIFICANT CHANGE UP (ref 135–145)
SPECIMEN SOURCE: SIGNIFICANT CHANGE UP
WBC # BLD: 8.15 K/UL — SIGNIFICANT CHANGE UP (ref 3.8–10.5)
WBC # FLD AUTO: 8.15 K/UL — SIGNIFICANT CHANGE UP (ref 3.8–10.5)

## 2021-02-13 RX ORDER — MAGNESIUM SULFATE 500 MG/ML
1 VIAL (ML) INJECTION ONCE
Refills: 0 | Status: COMPLETED | OUTPATIENT
Start: 2021-02-13 | End: 2021-02-13

## 2021-02-13 RX ADMIN — Medication 400 MILLIGRAM(S): at 12:00

## 2021-02-13 RX ADMIN — SENNA PLUS 2 TABLET(S): 8.6 TABLET ORAL at 21:43

## 2021-02-13 RX ADMIN — Medication 400 MILLIGRAM(S): at 06:01

## 2021-02-13 RX ADMIN — Medication 400 MILLIGRAM(S): at 01:03

## 2021-02-13 RX ADMIN — ENOXAPARIN SODIUM 40 MILLIGRAM(S): 100 INJECTION SUBCUTANEOUS at 14:42

## 2021-02-13 RX ADMIN — Medication 100 GRAM(S): at 14:42

## 2021-02-13 RX ADMIN — OXYCODONE HYDROCHLORIDE 5 MILLIGRAM(S): 5 TABLET ORAL at 18:39

## 2021-02-13 NOTE — PROGRESS NOTE ADULT - SUBJECTIVE AND OBJECTIVE BOX
SURGERY PROGRESS NOTE    SUBJECTIVE / 24H EVENTS:  Patient seen and examined on morning rounds. No acute events overnight.      OBJECTIVE:  VITAL SIGNS:  T(C): 36.8 (02-13-21 @ 10:00), Max: 36.9 (02-13-21 @ 06:18)  HR: 71 (02-13-21 @ 10:00) (56 - 85)  BP: 122/67 (02-13-21 @ 10:00) (110/64 - 148/88)  RR: 17 (02-13-21 @ 10:00) (14 - 18)  SpO2: 100% (02-13-21 @ 10:00) (95% - 100%)  Daily     Daily       PHYSICAL EXAM:  Gen: NAD  LS: Respirations unlabored   GI: Soft. Nontender. Nondistended. BS+.  Ext: Warm, well perfused      02-12-21 @ 07:01  -  02-13-21 @ 07:00  --------------------------------------------------------  IN:    Lactated Ringers: 850 mL    Oral Fluid: 360 mL  Total IN: 1210 mL    OUT:    Bulb (mL): 62.5 mL    Indwelling Catheter - Urethral (mL): 1755 mL  Total OUT: 1817.5 mL    Total NET: -607.5 mL      02-13-21 @ 07:01  -  02-13-21 @ 13:11  --------------------------------------------------------  IN:    Oral Fluid: 240 mL  Total IN: 240 mL    OUT:    Bulb (mL): 0 mL    Indwelling Catheter - Urethral (mL): 550 mL    Voided (mL): 50 mL  Total OUT: 600 mL    Total NET: -360 mL          LAB VALUES:  02-13    140  |  103  |  17  ----------------------------<  184<H>  4.0   |  26  |  1.06    Ca    8.6      13 Feb 2021 08:20  Phos  3.2     02-13  Mg     1.9     02-13                                 8.2    8.15  )-----------( 191      ( 13 Feb 2021 08:20 )             29.2         ABG - ( 12 Feb 2021 09:57 )  pH, Arterial: 7.46  pH, Blood: x     /  pCO2: 35    /  pO2: 235   / HCO3: 26    / Base Excess: 1.3   /  SaO2: 100.0                     MICROBIOLOGY:      RADIOLOGY:        MEDICATIONS  (STANDING):  enoxaparin Injectable 40 milliGRAM(s) SubCutaneous every 24 hours  influenza   Vaccine 0.5 milliLiter(s) IntraMuscular once  polyethylene glycol 3350 17 Gram(s) Oral daily  senna 2 Tablet(s) Oral at bedtime    MEDICATIONS  (PRN):  oxyCODONE    IR 5 milliGRAM(s) Oral every 4 hours PRN Moderate Pain (4 - 6)  oxyCODONE    IR 10 milliGRAM(s) Oral every 4 hours PRN Severe Pain (7 - 10)        SURGERY PROGRESS NOTE    SUBJECTIVE / 24H EVENTS:  Patient seen and examined on morning rounds. No acute events overnight. Patient tolerating regular diet, reports adequate pain control.       OBJECTIVE:  VITAL SIGNS:  T(C): 36.8 (02-13-21 @ 10:00), Max: 36.9 (02-13-21 @ 06:18)  HR: 71 (02-13-21 @ 10:00) (56 - 85)  BP: 122/67 (02-13-21 @ 10:00) (110/64 - 148/88)  RR: 17 (02-13-21 @ 10:00) (14 - 18)  SpO2: 100% (02-13-21 @ 10:00) (95% - 100%)  Daily     Daily       PHYSICAL EXAM:  Gen: NAD, resting in bed, alert and responding appropriately  Resp: Non-labored respirations on RA  Abd: Soft, nontender, nondistended  :  support on. Wound covered with dressing c/d/i. [place] in place with vini urine. RICO bulb in place to suction with s/s output  Ext: Grossly moving all extremities      02-12-21 @ 07:01  -  02-13-21 @ 07:00  --------------------------------------------------------  IN:    Lactated Ringers: 850 mL    Oral Fluid: 360 mL  Total IN: 1210 mL    OUT:    Bulb (mL): 62.5 mL    Indwelling Catheter - Urethral (mL): 1755 mL  Total OUT: 1817.5 mL    Total NET: -607.5 mL      02-13-21 @ 07:01  -  02-13-21 @ 13:11  --------------------------------------------------------  IN:    Oral Fluid: 240 mL  Total IN: 240 mL    OUT:    Bulb (mL): 0 mL    Indwelling Catheter - Urethral (mL): 550 mL    Voided (mL): 50 mL  Total OUT: 600 mL    Total NET: -360 mL          LAB VALUES:  02-13    140  |  103  |  17  ----------------------------<  184<H>  4.0   |  26  |  1.06    Ca    8.6      13 Feb 2021 08:20  Phos  3.2     02-13  Mg     1.9     02-13                                 8.2    8.15  )-----------( 191      ( 13 Feb 2021 08:20 )             29.2         ABG - ( 12 Feb 2021 09:57 )  pH, Arterial: 7.46  pH, Blood: x     /  pCO2: 35    /  pO2: 235   / HCO3: 26    / Base Excess: 1.3   /  SaO2: 100.0                     MICROBIOLOGY:      RADIOLOGY:        MEDICATIONS  (STANDING):  enoxaparin Injectable 40 milliGRAM(s) SubCutaneous every 24 hours  influenza   Vaccine 0.5 milliLiter(s) IntraMuscular once  polyethylene glycol 3350 17 Gram(s) Oral daily  senna 2 Tablet(s) Oral at bedtime    MEDICATIONS  (PRN):  oxyCODONE    IR 5 milliGRAM(s) Oral every 4 hours PRN Moderate Pain (4 - 6)  oxyCODONE    IR 10 milliGRAM(s) Oral every 4 hours PRN Severe Pain (7 - 10)

## 2021-02-13 NOTE — PROGRESS NOTE ADULT - SUBJECTIVE AND OBJECTIVE BOX
INTERVAL HPI/OVERNIGHT EVENTS:  Patient is a 55yMale  pain controlled with meds, jose raul diet      Vital Signs Last 24 Hrs  T(C): 36.8 (13 Feb 2021 10:00), Max: 36.9 (13 Feb 2021 06:18)  T(F): 98.2 (13 Feb 2021 10:00), Max: 98.4 (13 Feb 2021 06:18)  HR: 71 (13 Feb 2021 10:00) (52 - 85)  BP: 122/67 (13 Feb 2021 10:00) (110/64 - 148/88)  BP(mean): 91 (12 Feb 2021 15:30) (79 - 92)  RR: 17 (13 Feb 2021 10:00) (14 - 18)  SpO2: 100% (13 Feb 2021 10:00) (93% - 100%)  02-12 @ 07:01 - 02-13 @ 07:00  --------------------------------------------------------  IN: 1210 mL / OUT: 1817.5 mL / NET: -607.5 mL    02-13 @ 07:01 - 02-13 @ 10:57  --------------------------------------------------------  IN: 0 mL / OUT: 450 mL / NET: -450 mL        PHYSICAL EXAM:  Constitutional: well developed, well nourished, NAD  Eyes: anicteric  ENMT: normal facies, symmetric  Neck: supple  Respiratory: CTA bilat  Cardiovascular: RRR  Gastrointestinal: abdomen soft, nontender, nondistended. No obvious masses. No peritonitis  R inguinal dressing c/d/i, RICO with serosang drainage  Extremities: FROM, warm  Neurological: intact, non-focal  Skin: no gross lesions  Lymph Nodes: no gross adenopathy  Musculoskeletal: equal strength bilateral upper and lower extremities  Psychiatric: oriented x 3; appropriate          LABS:                        8.2    8.15  )-----------( 191      ( 13 Feb 2021 08:20 )             29.2     02-13    140  |  103  |  17  ----------------------------<  184<H>  4.0   |  26  |  1.06    Ca    8.6      13 Feb 2021 08:20  Phos  3.2     02-13  Mg     1.9     02-13          Magnesium, Serum: 1.9 mg/dL (02-13 @ 08:20)  Phosphorus Level, Serum: 3.2 mg/dL (02-13 @ 08:20)  Magnesium, Serum: 1.8 mg/dL (02-12 @ 13:34)  Phosphorus Level, Serum: 4.0 mg/dL (02-12 @ 13:34)

## 2021-02-13 NOTE — PROGRESS NOTE ADULT - SUBJECTIVE AND OBJECTIVE BOX
Interval Events:    No acute events overnight    S: Patient doing well, denies fevers, chills, nausea, emesis, chest pain, SOB.  Pain is well controlled. Tolerating PO w/o N/V.    O: Vital Signs Last 24 Hrs  T(C): 36.9 (13 Feb 2021 06:18), Max: 36.9 (13 Feb 2021 06:18)  T(F): 98.4 (13 Feb 2021 06:18), Max: 98.4 (13 Feb 2021 06:18)  HR: 61 (13 Feb 2021 06:18) (52 - 85)  BP: 113/63 (13 Feb 2021 06:18) (110/64 - 148/88)  BP(mean): 91 (12 Feb 2021 15:30) (79 - 92)  RR: 18 (13 Feb 2021 06:18) (14 - 18)  SpO2: 99% (13 Feb 2021 06:18) (93% - 99%)      12 Feb 2021 07:01  -  13 Feb 2021 07:00  --------------------------------------------------------  IN:    Lactated Ringers: 850 mL    Oral Fluid: 360 mL  Total IN: 1210 mL    OUT:    Bulb (mL): 62.5 mL    Indwelling Catheter - Urethral (mL): 1755 mL  Total OUT: 1817.5 mL    Total NET: -607.5 mL          Physical Exam:    Gen: Well-developed, well-nourished in no acute distress  Resp: No additional work of breathing   GI: Soft, non-tender, non-distended, with normoactive bowel sounds.  No masses.  :Inguinal incision with overlying dressing and scant drainage.  Scrotum non-tender, drain serosanguineous.  Ely draining clear yellow urine  MSK: Moves all extremities equally  Skin: No rashes    Labs:                        8.2    8.15  )-----------( 191      ( 13 Feb 2021 08:20 )             29.2     13 Feb 2021 08:20    140    |  103    |  17     ----------------------------<  184    4.0     |  26     |  1.06     Ca    8.6        13 Feb 2021 08:20  Phos  3.2       13 Feb 2021 08:20  Mg     1.9       13 Feb 2021 08:20        CAPILLARY BLOOD GLUCOSE                    MEDICATIONS  (STANDING):  acetaminophen  IVPB .. 1000 milliGRAM(s) IV Intermittent every 6 hours  enoxaparin Injectable 40 milliGRAM(s) SubCutaneous every 24 hours  influenza   Vaccine 0.5 milliLiter(s) IntraMuscular once  polyethylene glycol 3350 17 Gram(s) Oral daily  senna 2 Tablet(s) Oral at bedtime    MEDICATIONS  (PRN):  oxyCODONE    IR 5 milliGRAM(s) Oral every 4 hours PRN Moderate Pain (4 - 6)  oxyCODONE    IR 10 milliGRAM(s) Oral every 4 hours PRN Severe Pain (7 - 10)

## 2021-02-13 NOTE — PROGRESS NOTE ADULT - ASSESSMENT
Assessment/Plan:  55y Male now POD#1 s/p excision of scrotal mass, open appendectomy, and open right inguinal hernia repair on 02/12.    - Pain control  - Regular diet  - d/c Ely  - Strict I&Os  - Miralax & Senna  - DVT ppx: Lovenox  - Incentive spirometry  - OOB/Ambulate, avoid straining    Dispo: Home today pending TOV    [facility]  [id number]   Assessment/Plan:  55y Male now POD#1 s/p excision of scrotal mass, open appendectomy, and open right inguinal hernia repair on 02/12.    - Pain control  - Regular diet  - Appreciate Uro recs: OK with d/c Ely and home pending TOV  - Strict I&Os  - Miralax & Senna  - DVT ppx: Lovenox  - Incentive spirometry  - OOB/Ambulate, avoid straining      Dispo: Home today pending TOV    [facility]  [id number]

## 2021-02-13 NOTE — PROGRESS NOTE ADULT - ASSESSMENT
s/p excision scrotal mass and appendectomy  d/c crump per urology  TOV  d/c home later today if voiding and jose raul diet with good pain control

## 2021-02-13 NOTE — PROGRESS NOTE ADULT - ASSESSMENT
55yom s/p scrotal exploration and excision of scrotal mass.    Okay for discharge from a urologic perspective  Okay for ToV  Can f/u with Dr. Ramos in one week  D/w Dr. Ramos

## 2021-02-14 ENCOUNTER — TRANSCRIPTION ENCOUNTER (OUTPATIENT)
Age: 56
End: 2021-02-14

## 2021-02-14 VITALS
DIASTOLIC BLOOD PRESSURE: 81 MMHG | OXYGEN SATURATION: 99 % | TEMPERATURE: 99 F | RESPIRATION RATE: 20 BRPM | SYSTOLIC BLOOD PRESSURE: 131 MMHG | HEART RATE: 82 BPM

## 2021-02-14 RX ORDER — ACETAMINOPHEN 500 MG
975 TABLET ORAL EVERY 6 HOURS
Refills: 0 | Status: DISCONTINUED | OUTPATIENT
Start: 2021-02-14 | End: 2021-02-14

## 2021-02-14 RX ORDER — OXYCODONE HYDROCHLORIDE 5 MG/1
1 TABLET ORAL
Qty: 15 | Refills: 0
Start: 2021-02-14

## 2021-02-14 NOTE — DISCHARGE NOTE PROVIDER - CARE PROVIDER_API CALL
Ryan Martini (MD)  ColonRectal Surgery; Surgery  [facility], [address] [place] [address]  Phone: [phone]  Fax: [phone]  Follow Up Time: 1 week    Ricky Ramos; PhD)  Urology  [address] [facility], [address]  [place] [address]  Phone: [phone]  Fax: [phone]  Follow Up Time: 1 week   Ryan Martini (MD)  ColonRectal Surgery; Surgery  [facility], [address] [place] [address]  Phone: [phone]  Fax: [phone]  Follow Up Time: 2 weeks    Ricky Ramos; PhD)  Urology  [address] [facility], [address]  [place] [address]  Phone: [phone]  Fax: [phone]  Follow Up Time: 2 weeks

## 2021-02-14 NOTE — DISCHARGE NOTE PROVIDER - NSDCFUADDINST_GEN_ALL_CORE_FT
Please follow up with Dr. Martini and Dr. Ramos in 1 week. Please count RICO drain output. Staples will be removed in office

## 2021-02-14 NOTE — DISCHARGE NOTE PROVIDER - NSDCCPCAREPLAN_GEN_ALL_CORE_FT
PRINCIPAL DISCHARGE DIAGNOSIS  Diagnosis: Intra-abdominal and pelvic swelling of mass or lump  Assessment and Plan of Treatment: You went to the OR for removal of scrotall mass, appendix was removed and your hernia was repaired. A drain was placed which you must empty and record. Please follow up w/ urology and Dr. Martini in a week

## 2021-02-14 NOTE — DISCHARGE NOTE PROVIDER - NSDCMRMEDTOKEN_GEN_ALL_CORE_FT
Combigan 0.2%-0.5% ophthalmic solution: 1 drop(s) to each affected eye every 12 hours  latanoprost 0.005% ophthalmic solution: 1 drop(s) to each affected eye once a day (in the evening)   Combigan 0.2%-0.5% ophthalmic solution: 1 drop(s) to each affected eye every 12 hours  latanoprost 0.005% ophthalmic solution: 1 drop(s) to each affected eye once a day (in the evening)  oxyCODONE 5 mg oral tablet: 1 tab(s) orally every 8 hours MDD:4 tabs daily

## 2021-02-14 NOTE — PROGRESS NOTE ADULT - SUBJECTIVE AND OBJECTIVE BOX
UROLOGY DAILY PROGRESS NOTE:     Subjective:    No events overnight. Feels well.  Passed TOV yesterday. Voiding comfortably    Objective:    NAD, awake and alert  Respirations nonlabored  Abdomen soft, nontender, nondistended  Right inguinal incision dressed  Scrotal support in place  RICO SS    MEDICATIONS  (STANDING):  enoxaparin Injectable 40 milliGRAM(s) SubCutaneous every 24 hours  influenza   Vaccine 0.5 milliLiter(s) IntraMuscular once  polyethylene glycol 3350 17 Gram(s) Oral daily  senna 2 Tablet(s) Oral at bedtime    MEDICATIONS  (PRN):  acetaminophen   Tablet .. 975 milliGRAM(s) Oral every 6 hours PRN Mild Pain (1 - 3)  oxyCODONE    IR 5 milliGRAM(s) Oral every 4 hours PRN Moderate Pain (4 - 6)  oxyCODONE    IR 10 milliGRAM(s) Oral every 4 hours PRN Severe Pain (7 - 10)      Vital Signs Last 24 Hrs  T(C): 37.2 (14 Feb 2021 05:46), Max: 37.4 (14 Feb 2021 02:38)  T(F): 98.9 (14 Feb 2021 05:46), Max: 99.3 (14 Feb 2021 02:38)  HR: 66 (14 Feb 2021 05:46) (66 - 82)  BP: 107/68 (14 Feb 2021 05:46) (104/60 - 137/74)  BP(mean): --  RR: 20 (14 Feb 2021 05:46) (17 - 20)  SpO2: 99% (14 Feb 2021 05:46) (97% - 100%)    I&O's Detail    13 Feb 2021 07:01  -  14 Feb 2021 07:00  --------------------------------------------------------  IN:    Oral Fluid: 720 mL  Total IN: 720 mL    OUT:    Bulb (mL): 40 mL    Indwelling Catheter - Urethral (mL): 550 mL    Voided (mL): 1500 mL  Total OUT: 2090 mL    Total NET: -1370 mL          Daily     Daily     LABS:                        8.2    8.15  )-----------( 191      ( 13 Feb 2021 08:20 )             29.2     02-13    140  |  103  |  17  ----------------------------<  184<H>  4.0   |  26  |  1.06    Ca    8.6      13 Feb 2021 08:20  Phos  3.2     02-13  Mg     1.9     02-13

## 2021-02-14 NOTE — DISCHARGE NOTE NURSING/CASE MANAGEMENT/SOCIAL WORK - NSDCPNINST_GEN_ALL_CORE
patient is to call if temperature is above 101.4  call if abd pain increases . maintain gerald care as shown . follow up appointment needed for gerald removal .

## 2021-02-14 NOTE — PROGRESS NOTE ADULT - ASSESSMENT
55yom s/p scrotal exploration and excision of scrotal mass  Passed postop voiding trial    Continue scrotal support  Okay for discharge from a urologic perspective  Can f/u with Dr. Ramos in one week    [facility]  [address] [place] [address], [place] [id number] [phone]

## 2021-02-14 NOTE — DISCHARGE NOTE PROVIDER - PROVIDER TOKENS
PROVIDER:[TOKEN:[[id number]],FOLLOWUP:[1 week]],PROVIDER:[TOKEN:[[id number]],FOLLOWUP:[1 week]] PROVIDER:[TOKEN:[[id number]],FOLLOWUP:[2 weeks]],PROVIDER:[TOKEN:[[id number]],FOLLOWUP:[2 weeks]]

## 2021-02-14 NOTE — PROGRESS NOTE ADULT - SUBJECTIVE AND OBJECTIVE BOX
Surgery Progress Note    NOTE INCOMPLETE      SUBJECTIVE: No acute events overnight. Passe TOV.  Pt seen and examined at bedside. Patient comfortable. No nausea, vomiting, diarrhea. Pain is controlled. *** Flatus/***BM. Tolerating diet.    Vital Signs Last 24 Hrs  T(C): 37.2 (14 Feb 2021 05:46), Max: 37.4 (14 Feb 2021 02:38)  T(F): 98.9 (14 Feb 2021 05:46), Max: 99.3 (14 Feb 2021 02:38)  HR: 66 (14 Feb 2021 05:46) (66 - 82)  BP: 107/68 (14 Feb 2021 05:46) (104/60 - 137/74)  BP(mean): --  RR: 20 (14 Feb 2021 05:46) (17 - 20)  SpO2: 99% (14 Feb 2021 05:46) (97% - 100%)    PHYSICAL EXAM:  Gen: NAD, resting in bed, alert and responding appropriately  Resp: Non-labored respirations on RA  Abd: Soft, nontender, nondistended  :  support on. Wound covered with dressing c/d/i.  RICO bulb in place to suction with s/s output  Ext: Grossly moving all extremities    LABS:                        8.2    8.15  )-----------( 191      ( 13 Feb 2021 08:20 )             29.2     02-13    140  |  103  |  17  ----------------------------<  184<H>  4.0   |  26  |  1.06    Ca    8.6      13 Feb 2021 08:20  Phos  3.2     02-13  Mg     1.9     02-13            INs and OUTs:    02-13-21 @ 07:01  -  02-14-21 @ 07:00  --------------------------------------------------------  IN: 720 mL / OUT: 2090 mL / NET: -1370 mL        Medications:  MEDICATIONS  (STANDING):  enoxaparin Injectable 40 milliGRAM(s) SubCutaneous every 24 hours  influenza   Vaccine 0.5 milliLiter(s) IntraMuscular once  polyethylene glycol 3350 17 Gram(s) Oral daily  senna 2 Tablet(s) Oral at bedtime    MEDICATIONS  (PRN):  oxyCODONE    IR 5 milliGRAM(s) Oral every 4 hours PRN Moderate Pain (4 - 6)  oxyCODONE    IR 10 milliGRAM(s) Oral every 4 hours PRN Severe Pain (7 - 10)   Surgery Progress Note      SUBJECTIVE: No acute events overnight. Passe TOV.  Pt seen and examined at bedside. Patient comfortable. No nausea, vomiting, diarrhea. Pain is controlled. Tolerating diet.    Vital Signs Last 24 Hrs  T(C): 37.2 (14 Feb 2021 05:46), Max: 37.4 (14 Feb 2021 02:38)  T(F): 98.9 (14 Feb 2021 05:46), Max: 99.3 (14 Feb 2021 02:38)  HR: 66 (14 Feb 2021 05:46) (66 - 82)  BP: 107/68 (14 Feb 2021 05:46) (104/60 - 137/74)  BP(mean): --  RR: 20 (14 Feb 2021 05:46) (17 - 20)  SpO2: 99% (14 Feb 2021 05:46) (97% - 100%)    PHYSICAL EXAM:  Gen: NAD, resting in bed, alert and responding appropriately  Resp: Non-labored respirations on RA  Abd: Soft, nontender, nondistended  :  support on. Wound covered with dressing c/d/i.  RICO bulb in place to suction with s/s output  Ext: Grossly moving all extremities    LABS:                        8.2    8.15  )-----------( 191      ( 13 Feb 2021 08:20 )             29.2     02-13    140  |  103  |  17  ----------------------------<  184<H>  4.0   |  26  |  1.06    Ca    8.6      13 Feb 2021 08:20  Phos  3.2     02-13  Mg     1.9     02-13            INs and OUTs:    02-13-21 @ 07:01  -  02-14-21 @ 07:00  --------------------------------------------------------  IN: 720 mL / OUT: 2090 mL / NET: -1370 mL        Medications:  MEDICATIONS  (STANDING):  enoxaparin Injectable 40 milliGRAM(s) SubCutaneous every 24 hours  influenza   Vaccine 0.5 milliLiter(s) IntraMuscular once  polyethylene glycol 3350 17 Gram(s) Oral daily  senna 2 Tablet(s) Oral at bedtime    MEDICATIONS  (PRN):  oxyCODONE    IR 5 milliGRAM(s) Oral every 4 hours PRN Moderate Pain (4 - 6)  oxyCODONE    IR 10 milliGRAM(s) Oral every 4 hours PRN Severe Pain (7 - 10)

## 2021-02-14 NOTE — DISCHARGE NOTE PROVIDER - HOSPITAL COURSE
Pt was brought to OR for excision of scrotal mass, open appendectomy, and open right inguinal hernia repair and a trent drain was placed.RICO teaching was completed and pt has prior history of emptying drains. Pt is comfortable managing the drain at home and will be seen in office to have it removed.    Pt had an uneventful hospital stay after surgery. Pain is controlled and oxycodone was sent to rx. Pt is tolerating diet, pain controlled and able to ambulate.    Pt to follow up with Dr. Martini and urology in 1 week. Drain and staples to be removed outpatient

## 2021-02-14 NOTE — PROGRESS NOTE ADULT - ASSESSMENT
55y Male now POD#2 s/p excision of scrotal mass, open appendectomy, and open right inguinal hernia repair on 02/12.    -Passed TOV 500cc  - Pain control  - Regular diet  - Appreciate Uro recs: OK with d/c Ely and home if pass TOV  - Strict I&Os  - Miralax & Senna  - DVT ppx: Lovenox  - Incentive spirometry  - OOB/Ambulate, avoid straining      Dispo: Possible DC today     [facility]  [id number]   55y Male now POD#2 s/p excision of scrotal mass, open appendectomy, and open right inguinal hernia repair on 02/12.    - Passed TOV 500cc  - Pain control  - Regular diet  - Appreciate Uro recs: OK with d/c Ely and home if pass TOV  - Strict I&Os  - Miralax & Senna  - DVT ppx: Lovenox  - Incentive spirometry  - OOB/Ambulate, avoid straining      Dispo: DC today     [facility] Surgery  [id number]

## 2021-02-14 NOTE — DISCHARGE NOTE NURSING/CASE MANAGEMENT/SOCIAL WORK - PATIENT PORTAL LINK FT
You can access the FollowMyHealth Patient Portal offered by [facility] by registering at the following website: [email or web address]. By joining [facility]’s FollowMyHealth portal, you will also be able to view your health information using other applications (apps) compatible with our system.

## 2021-02-18 LAB
CULTURE RESULTS: SIGNIFICANT CHANGE UP
SPECIMEN SOURCE: SIGNIFICANT CHANGE UP
SURGICAL PATHOLOGY STUDY: SIGNIFICANT CHANGE UP

## 2021-02-25 ENCOUNTER — APPOINTMENT (OUTPATIENT)
Dept: COLORECTAL SURGERY | Facility: CLINIC | Age: 56
End: 2021-02-25
Payer: MEDICAID

## 2021-02-25 PROCEDURE — 99024 POSTOP FOLLOW-UP VISIT: CPT

## 2021-02-25 NOTE — HISTORY OF PRESENT ILLNESS
[FreeTextEntry1] : 55-year-old male status post excision of large right scrotal/abdominal mass. Patient progressing well. Improving pain. No fevers or chills. No nausea or vomiting tolerating diet with bowel movements

## 2021-02-25 NOTE — ASSESSMENT
[FreeTextEntry1] : Large hydrocele\par -pathology reviewed no malignancy\par -Continue drain at this time patient to monitor output he believes is approximately 30 cc per day currently\par -Follow up next week for additional staple removal and drain normal

## 2021-03-04 ENCOUNTER — APPOINTMENT (OUTPATIENT)
Dept: COLORECTAL SURGERY | Facility: CLINIC | Age: 56
End: 2021-03-04
Payer: MEDICAID

## 2021-03-04 VITALS — TEMPERATURE: 97.4 F

## 2021-03-04 PROCEDURE — 99024 POSTOP FOLLOW-UP VISIT: CPT

## 2021-03-04 NOTE — ASSESSMENT
[FreeTextEntry1] : Large complex hydrocele\par -Patient progressing well\par -No heavy lifting\par - followup in 4-6 weeks for wound check

## 2021-03-04 NOTE — HISTORY OF PRESENT ILLNESS
[FreeTextEntry1] : Status post excision of largeScrotal hydrocele Extending into the abdomen. Patient progressing well. Improving pain no fevers or chills no nausea or vomiting

## 2021-04-08 ENCOUNTER — APPOINTMENT (OUTPATIENT)
Dept: COLORECTAL SURGERY | Facility: CLINIC | Age: 56
End: 2021-04-08
Payer: MEDICAID

## 2021-04-08 VITALS — TEMPERATURE: 97.7 F

## 2021-04-08 PROCEDURE — 99024 POSTOP FOLLOW-UP VISIT: CPT

## 2021-04-08 NOTE — ASSESSMENT
[FreeTextEntry1] : Large right sided hydrocele status post resection\par -Patient progressing well\par -No heavy lifting for 4 more weeks\par -Follow up as needed

## 2021-04-08 NOTE — HISTORY OF PRESENT ILLNESS
[FreeTextEntry1] : Status post excision of hydrocele. Patient progressing well. Denies painOtherwise without complaint

## 2021-05-04 ENCOUNTER — APPOINTMENT (OUTPATIENT)
Dept: ORTHOPEDIC SURGERY | Facility: CLINIC | Age: 56
End: 2021-05-04
Payer: MEDICAID

## 2021-05-04 VITALS — HEART RATE: 62 BPM | DIASTOLIC BLOOD PRESSURE: 79 MMHG | SYSTOLIC BLOOD PRESSURE: 136 MMHG

## 2021-05-04 VITALS — BODY MASS INDEX: 28.49 KG/M2 | WEIGHT: 215 LBS | HEIGHT: 73 IN

## 2021-05-04 DIAGNOSIS — M25.522 PAIN IN LEFT ELBOW: ICD-10-CM

## 2021-05-04 PROCEDURE — 99203 OFFICE O/P NEW LOW 30 MIN: CPT

## 2021-05-04 PROCEDURE — 99072 ADDL SUPL MATRL&STAF TM PHE: CPT

## 2021-05-04 PROCEDURE — 73080 X-RAY EXAM OF ELBOW: CPT | Mod: LT

## 2021-05-12 ENCOUNTER — OUTPATIENT (OUTPATIENT)
Dept: OUTPATIENT SERVICES | Facility: HOSPITAL | Age: 56
LOS: 1 days | End: 2021-05-12
Payer: MEDICAID

## 2021-05-12 ENCOUNTER — APPOINTMENT (OUTPATIENT)
Dept: ORTHOPEDIC SURGERY | Facility: CLINIC | Age: 56
End: 2021-05-12

## 2021-05-12 ENCOUNTER — APPOINTMENT (OUTPATIENT)
Dept: MRI IMAGING | Facility: IMAGING CENTER | Age: 56
End: 2021-05-12
Payer: MEDICAID

## 2021-05-12 DIAGNOSIS — Z00.00 ENCOUNTER FOR GENERAL ADULT MEDICAL EXAMINATION WITHOUT ABNORMAL FINDINGS: ICD-10-CM

## 2021-05-12 DIAGNOSIS — Z98.890 OTHER SPECIFIED POSTPROCEDURAL STATES: Chronic | ICD-10-CM

## 2021-05-12 DIAGNOSIS — K80.20 CALCULUS OF GALLBLADDER WITHOUT CHOLECYSTITIS WITHOUT OBSTRUCTION: Chronic | ICD-10-CM

## 2021-05-12 PROBLEM — M25.522 LEFT ELBOW PAIN: Status: ACTIVE | Noted: 2021-05-12

## 2021-05-12 PROCEDURE — 73221 MRI JOINT UPR EXTREM W/O DYE: CPT | Mod: 26,LT

## 2021-05-12 PROCEDURE — 73221 MRI JOINT UPR EXTREM W/O DYE: CPT

## 2021-05-12 NOTE — HISTORY OF PRESENT ILLNESS
[de-identified] : 56 year old male presents today with left elbow pain x 4 days. No injury reported. States that he noticed pain in his elbow after taking nap on Saturday. He was seen at [facility] x-rays demonstrated effusion and  possible radial head fx. He recalls possible yanking his forearm backwards doing work around the house but he didn’t think anything of it. He is not taking pain medication. Patient presents in splint. Denies numbness or tingling. \par \par The patient's past medical history, past surgical history, medications and allergies were reviewed by me today with the patient and documented accordingly. In addition, the patient's family and social history, which were noncontributory to this visit, were reviewed also.

## 2021-05-12 NOTE — DISCUSSION/SUMMARY
[de-identified] : 57 y/o male with left elbow pain. \par \par Patient presents with an acute injury to the left elbow consistent with possible distal biceps tendon injury.  Discussed risk factors associated including hypervascularity of the tendon, intrinsic degeneration, and possible mechanical impingement. Mechanism of injury typically includes eccentric tension from a flexed to extended elbow position. I discussed the utility of MRI to distinguish degree of tendon injury and potential need for surgical intervention. \par \par Discussed treatment alternatives including nonoperative versus operative intervention. Acute operative intervention would repair the tendon back to the radial tuberosity to improve long-term function/strength. Nonoperative management would provide possible functional deficits including loss of 50% of supination strength, 30% of elbow flexion strength and possibly some  strength. \par \par Recommendations: Ice/NSAIDs, activity restriction, MRI imaging\par \par Follow-up after MRI.

## 2021-05-12 NOTE — PHYSICAL EXAM
[de-identified] : Oriented to time, place, person\par Mood: Normal\par Affect: Normal\par Appearance: Healthy, well appearing, no acute distress.\par Gait: Normal\par Assistive Devices: None\par \par Left elbow exam\par \par Skin: Clean, dry, intact. No ecchymosis. No swelling. No palpable joint effusion.\par ROM: , full supination/pronation.\par Painful ROM: None\par Tenderness: +radial tuberosity.  No medial epicondyle pain. No lateral epicondyle pain. No olecranon pain. No pain at radial head.\par Strength: 5/5 elbow flexion, 5/5 elbow extension, 5/5 supination, 5/5 pronation\par Stability: Stable to vaus/valgus stress\par Vasc: 2+ radial pulse, <2s cap refill\par Sensation: In tact to light touch throughout\par Neuro: Negative tinels at ulnar canal, AIN/PIN/Ulnar nerve in tact to motor/sensation  [de-identified] : The following radiographs were ordered and read by me during this patients visit. I reviewed each radiograph in detail with the patient and discussed the findings as highlighted below. \par \par 3 views of the left elbow were obtained today, 05/04/2021, that show no acute fracture or dislocation. There is no degenerative change seen. There is no gross malalignment. No significant other obvious osseous abnormality, otherwise unremarkable.

## 2021-05-12 NOTE — ADDENDUM
[FreeTextEntry1] : This note was written by Gloria Lamas on 05/04/2021 acting solely as a scribe for Dr. Anthony Ohara.\par \par All medical record entries made by the Scribe were at my, Dr. Anthony Ohara, direction and personally dictated by me on 05/04/2021. I have personally reviewed the chart and agree that the record accurately reflects my personal performance of the history, physical exam, assessment and plan.

## 2021-05-17 ENCOUNTER — NON-APPOINTMENT (OUTPATIENT)
Age: 56
End: 2021-05-17

## 2021-06-21 ENCOUNTER — APPOINTMENT (OUTPATIENT)
Dept: ORTHOPEDIC SURGERY | Facility: CLINIC | Age: 56
End: 2021-06-21
Payer: MEDICAID

## 2021-06-21 DIAGNOSIS — S60.222A CONTUSION OF LEFT HAND, INITIAL ENCOUNTER: ICD-10-CM

## 2021-06-21 PROCEDURE — 73110 X-RAY EXAM OF WRIST: CPT | Mod: LT

## 2021-06-21 PROCEDURE — 99214 OFFICE O/P EST MOD 30 MIN: CPT

## 2021-06-21 NOTE — ADDENDUM
[FreeTextEntry1] : This note was written by Gloria Lamas on 06/21/2021 acting solely as a scribe for Dr. Anthony Ohara.\par \par All medical record entries made by the Scribe were at my, Dr. Anthony Ohara, direction and personally dictated by me on 06/21/2021. I have personally reviewed the chart and agree that the record accurately reflects my personal performance of the history, physical exam, assessment and plan.

## 2021-06-21 NOTE — HISTORY OF PRESENT ILLNESS
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 Patient states that he was crossing the street where he tripped and fell directly on his left hand. Was seen in the ER and was placed in a Aircast and ace wrap. The pain is located to the lateral wrist and dorsal hand and brought on with flexion of the wrist. Denies numbness or tingling.

## 2021-06-21 NOTE — PHYSICAL EXAM
[de-identified] : Oriented to time, place, person\par Mood: Normal\par Affect: Normal\par Appearance: Healthy, well appearing, no acute distress.\par Gait: Normal\par Assistive Devices: aircast/ace wrap\par \par Left  Wrist Exam:\par \par Skin: Clean, dry, intact. No ecchymosis. + dorsal hand swelling. No palpable deformity. No palpable joint effusion. No crepitus.\par ROM: full, full supination/pronation.\par Painful ROM: pain with flexion/extension\par Tenderness: +pain to the 2-5th metacarpals. No pain at the scapholunate interval. No snuffbox pain. No TTP at the distal radius, distal ulna, or the DRUJ.\par Strength: 5/5 wrist flexion, 5/5 wrist extension, 5/5 supination, 5/5 pronation\par Stability: Stable DRUJ\par Vasc: 2+ radial pulse, <2s cap refill throughout\par Sensation: In tact to light touch throughout\par Neuro: Negative Tinel's over median nerve, AIN/PIN/Ulnar nerve in tact to motor/sensation.  [de-identified] : The following radiographs were ordered and read by me during this patients visit. I reviewed each radiograph in detail with the patient and discussed the findings as highlighted below. \par \par 3 views of the left wrist were obtained today, 06/21/2021, that show no acute fracture or dislocation. There is no degenerative change seen. There is no gross malalignment. No significant other obvious osseous abnormality, otherwise unremarkable.

## 2021-06-21 NOTE — DISCUSSION/SUMMARY
[de-identified] : 57 y/o male with left hand contusion\par \par A discussion was had with the patient regarding symptomatic treatment for left hand contusion, with local swelling throughout the dorsal wrist and hand.  Recommendation is for continued immobilization with a volar wrist brace x2 weeks, with gradual mobilization of the digits and wrist.  No internal derangement suspected on x-ray imaging.  Would recommend activity modification/work restrictions x2 weeks.\par \par Recommendations; Conservative care & observation, OTC NSAID's or acetaminophen as tolerated, application of ice to the area 2-3x daily for 20 minutes after periods of activity.  Volar brace x2 weeks\par \par Follow up as needed.

## 2021-07-29 ENCOUNTER — APPOINTMENT (OUTPATIENT)
Dept: ORTHOPEDIC SURGERY | Facility: CLINIC | Age: 56
End: 2021-07-29
Payer: MEDICAID

## 2021-07-29 DIAGNOSIS — R20.0 ANESTHESIA OF SKIN: ICD-10-CM

## 2021-07-29 PROCEDURE — 99214 OFFICE O/P EST MOD 30 MIN: CPT

## 2021-07-29 PROCEDURE — 73090 X-RAY EXAM OF FOREARM: CPT | Mod: RT

## 2021-07-30 PROBLEM — R20.0 NUMBNESS OF ARM: Status: ACTIVE | Noted: 2021-07-30

## 2021-07-30 NOTE — PHYSICAL EXAM
[de-identified] : Oriented to time, place, person\par Mood: Normal\par Affect: Normal\par Appearance: Healthy, well appearing, no acute distress.\par Gait: Normal\par Assistive Devices: None\par \par Right elbow exam\par \par Skin: Clean, dry, intact. No ecchymosis. No swelling. No palpable joint effusion.\par ROM: full flexion/extension, full supination/pronation.\par Painful ROM: None \par Tenderness: +ulnar nerve subluxation No medial epicondyle pain. No lateral epicondyle pain. No olecranon pain. No pain at radial head.\par Strength: 5/5 elbow flexion, 5/5 elbow extension, 5/5 supination, 5/5 pronation\par Stability: Stable to vaus/valgus stress\par Vasc: 2+ radial pulse, <2s cap refill\par Sensation: +numbness to volar ulnar aspect of the forearm\par Neuro: Negative tinels at ulnar canal, AIN/PIN/Ulnar nerve in tact to motor/sensation  [de-identified] : The following radiographs were ordered and read by me during this patients visit. I reviewed each radiograph in detail with the patient and discussed the findings as highlighted below. \par \par 3 views of the right forearm were obtained today, 07/29/2021, that show no acute fracture or dislocation. There is no degenerative change seen. There is no gross malalignment. No significant other obvious osseous abnormality, otherwise unremarkable.

## 2021-07-30 NOTE — ADDENDUM
[FreeTextEntry1] : This note was written by Gloria Lamas on 07/29/2021 acting solely as a scribe for Dr. Anthony Ohara.\par \par All medical record entries made by the Scribe were at my, Dr. Anthony Ohara, direction and personally dictated by me on 07/29/2021. I have personally reviewed the chart and agree that the record accurately reflects my personal performance of the history, physical exam, assessment and plan.

## 2021-07-30 NOTE — HISTORY OF PRESENT ILLNESS
[de-identified] : 56 year old male presents today with right forearm numbness x 2 days.  Patient reports a patch of numbness over the ulnar aspect of the forearm.  Denies any significant pain.  Denies any other concerns within the forearm.

## 2021-08-26 ENCOUNTER — APPOINTMENT (OUTPATIENT)
Dept: ORTHOPEDIC SURGERY | Facility: CLINIC | Age: 56
End: 2021-08-26

## 2021-08-26 ENCOUNTER — APPOINTMENT (OUTPATIENT)
Dept: ORTHOPEDIC SURGERY | Facility: HOSPITAL | Age: 56
End: 2021-08-26

## 2021-08-26 ENCOUNTER — APPOINTMENT (OUTPATIENT)
Dept: ORTHOPEDIC SURGERY | Facility: CLINIC | Age: 56
End: 2021-08-26
Payer: MEDICAID

## 2021-08-26 VITALS — WEIGHT: 217 LBS | HEIGHT: 73 IN | BODY MASS INDEX: 28.76 KG/M2

## 2021-08-26 DIAGNOSIS — M79.605 PAIN IN RIGHT LEG: ICD-10-CM

## 2021-08-26 DIAGNOSIS — M79.604 PAIN IN RIGHT LEG: ICD-10-CM

## 2021-08-26 PROCEDURE — 73590 X-RAY EXAM OF LOWER LEG: CPT | Mod: RT

## 2021-08-26 PROCEDURE — 99213 OFFICE O/P EST LOW 20 MIN: CPT

## 2021-08-26 RX ORDER — NAPROXEN 500 MG/1
500 TABLET ORAL
Qty: 20 | Refills: 0 | Status: ACTIVE | COMMUNITY
Start: 2021-08-26 | End: 1900-01-01

## 2021-09-16 PROBLEM — M79.604 PAIN IN BOTH LOWER EXTREMITIES: Status: ACTIVE | Noted: 2021-09-16

## 2023-04-20 ENCOUNTER — APPOINTMENT (OUTPATIENT)
Dept: UROLOGY | Facility: CLINIC | Age: 58
End: 2023-04-20
Payer: MEDICAID

## 2023-04-20 VITALS
TEMPERATURE: 98.2 F | HEART RATE: 77 BPM | DIASTOLIC BLOOD PRESSURE: 82 MMHG | SYSTOLIC BLOOD PRESSURE: 130 MMHG | RESPIRATION RATE: 17 BRPM

## 2023-04-20 DIAGNOSIS — N52.9 MALE ERECTILE DYSFUNCTION, UNSPECIFIED: ICD-10-CM

## 2023-04-20 DIAGNOSIS — Z12.5 ENCOUNTER FOR SCREENING FOR MALIGNANT NEOPLASM OF PROSTATE: ICD-10-CM

## 2023-04-20 DIAGNOSIS — N43.3 HYDROCELE, UNSPECIFIED: ICD-10-CM

## 2023-04-20 PROCEDURE — 99214 OFFICE O/P EST MOD 30 MIN: CPT

## 2023-04-20 RX ORDER — TADALAFIL 20 MG/1
20 TABLET ORAL
Qty: 30 | Refills: 6 | Status: ACTIVE | COMMUNITY
Start: 2023-04-20 | End: 1900-01-01

## 2023-04-20 NOTE — PHYSICAL EXAM
[General Appearance - Well Developed] : well developed [General Appearance - Well Nourished] : well nourished [Bowel Sounds] : normal bowel sounds [Abdomen Soft] : soft [Skin Color & Pigmentation] : normal skin color and pigmentation [Skin Turgor] : supple [] : no respiratory distress [Respiration, Rhythm And Depth] : normal respiratory rhythm and effort [Oriented To Time, Place, And Person] : oriented to person, place, and time [Not Anxious] : not anxious [Normal Station and Gait] : the gait and station were normal for the patient's age [No Focal Deficits] : no focal deficits

## 2023-05-17 LAB
PSA SERPL-MCNC: 0.36 NG/ML
TESTOST SERPL-MCNC: 461 NG/DL

## 2023-05-17 NOTE — HISTORY OF PRESENT ILLNESS
[FreeTextEntry1] : : Mar  3 1965 \par Referring Provider: MAYKEL RODRIGEZ,MEHDI CORREIA \par \par HPI: Mr. ROXANNA POST is a 58 year yo M with a PMHx notable for right hydrocele s/p repair with Dr. Ramos in . Two years previously he didn't have erectile issues. He underwent a complex repair and excision of hydrocele in  with Dr. Ramos and Dr. Martini. 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 \par \par Since then he has had issues with his erections, able to obtain but unable to continue or hold erections. Erections last for about 5 minutes and then detumesces. T in 2020 was 414. No recent check. Never tried PDE5i. \par \par PSA 0.25 in 2020. \par \par Anticoagulation: None\par All: NKDA\par Social: single, 3 children, no EtOH\par PMHx: no CAD, none\par FHx: None\par PSHx: right scrotal surgery\par  [Urinary Incontinence] : no urinary incontinence [Urinary Retention] : no urinary retention [Urinary Urgency] : no urinary urgency [Urinary Frequency] : no urinary frequency

## 2025-05-29 NOTE — H&P PST ADULT - LAB RESULTS AND INTERPRETATION
[email or web address]         CBC, T&S pending